# Patient Record
Sex: MALE | Race: WHITE
[De-identification: names, ages, dates, MRNs, and addresses within clinical notes are randomized per-mention and may not be internally consistent; named-entity substitution may affect disease eponyms.]

---

## 2018-02-19 ENCOUNTER — HOSPITAL ENCOUNTER (OUTPATIENT)
Dept: HOSPITAL 47 - RADXRMAIN | Age: 33
Discharge: HOME | End: 2018-02-19
Payer: COMMERCIAL

## 2018-02-19 DIAGNOSIS — M25.552: ICD-10-CM

## 2018-02-19 DIAGNOSIS — M51.37: Primary | ICD-10-CM

## 2018-02-19 DIAGNOSIS — M25.551: ICD-10-CM

## 2018-02-19 PROCEDURE — 72110 X-RAY EXAM L-2 SPINE 4/>VWS: CPT

## 2018-02-19 PROCEDURE — 73521 X-RAY EXAM HIPS BI 2 VIEWS: CPT

## 2018-02-19 NOTE — XR
EXAM TYPE: LUMBAR SPINE X RAY SERIES

 

COMPARISON: NONE

 

HISTORY: Back pain

 

TECHNIQUE: 4 views are submitted.

 

FINDINGS:

Alignment is anatomic.  The pedicles are intact.  The transverse processes are intact.  There is no s
pondylolysis or spondylolisthesis.  Hypertrophic and degenerative disc disease L4-5 and L5-S1.

 

IMPRESSION:

1. Degenerative disc disease L4-5 and L5-S1 consider MRI follow-up.

## 2018-02-19 NOTE — XR
EXAMINATION TYPE: XR Hip Bilateral Complete

 

DATE OF EXAM: 2/19/2018

 

COMPARISON: NONE

 

HISTORY: Pain

 

TECHNIQUE: 2 views of each hip submitted

 

FINDINGS:

There is no evidence of erosive change or acute fracture.

 

 

IMPRESSION:

1. No evidence of acute fracture or dislocation. If symptoms persist consider MRI.

## 2019-02-02 ENCOUNTER — HOSPITAL ENCOUNTER (EMERGENCY)
Dept: HOSPITAL 47 - EC | Age: 34
LOS: 1 days | Discharge: HOME | End: 2019-02-03
Payer: COMMERCIAL

## 2019-02-02 VITALS
TEMPERATURE: 98.3 F | HEART RATE: 97 BPM | RESPIRATION RATE: 20 BRPM | DIASTOLIC BLOOD PRESSURE: 81 MMHG | SYSTOLIC BLOOD PRESSURE: 125 MMHG

## 2019-02-02 DIAGNOSIS — Z23: ICD-10-CM

## 2019-02-02 DIAGNOSIS — F17.200: ICD-10-CM
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
## 2019-02-02 DIAGNOSIS — Z53.29: ICD-10-CM

## 2019-02-02 DIAGNOSIS — S61.210A: Primary | ICD-10-CM
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
## 2019-02-02 PROCEDURE — 90471 IMMUNIZATION ADMIN: CPT

## 2019-02-02 PROCEDURE — 12001 RPR S/N/AX/GEN/TRNK 2.5CM/<: CPT

## 2019-02-02 PROCEDURE — 90715 TDAP VACCINE 7 YRS/> IM: CPT

## 2019-02-02 PROCEDURE — 99283 EMERGENCY DEPT VISIT LOW MDM: CPT

## 2019-02-02 NOTE — XR
EXAMINATION TYPE: XR finger RT

 

DATE OF EXAM: 2/2/2019

 

COMPARISON: NONE

 

HISTORY: Laceration

 

TECHNIQUE: 3 views

 

FINDINGS: There is laceration deformity of the soft tissues at the PIP joint of the index finger. I s
ee no radiopaque foreign body. I see no fracture. There is no dislocation.

 

IMPRESSION: Large laceration deformity.

## 2019-02-03 NOTE — ED
General Adult HPI





- General


Chief complaint: Wound/Laceration


Stated complaint: Lac Finger


Time Seen by Provider: 02/02/19 22:58


Source: patient, RN notes reviewed


Mode of arrival: ambulatory


Limitations: no limitations





- History of Present Illness


Initial comments: 





33-year-old male presents to the emergency department for a chief complaint of 

laceration that occurred approximately one hour prior to arrival.  Patient was 

using a  when he cut his finger.  Patient denies any pain with 

movement of the finger.  Patient states he stopped bleeding shortly afterwards 

and bleeding is controlled at this time.  He is not up-to-date on tetanus.  No 

other complaints at this time.  Patient has no other complaints at this time 

including shortness of breath, chest pain, abdominal pain, nausea or vomiting, 

headache, or visual changes.





- Related Data


 Previous Rx's











 Medication  Instructions  Recorded


 


Cephalexin [Keflex] 500 mg PO Q6HR 3 Days #12 cap 02/03/19











 Allergies











Allergy/AdvReac Type Severity Reaction Status Date / Time


 


No Known Allergies Allergy   Verified 02/02/19 22:56














Review of Systems


ROS Statement: 


Those systems with pertinent positive or pertinent negative responses have been 

documented in the HPI.





ROS Other: All systems not noted in ROS Statement are negative.





Past Medical History


Past Medical History: No Reported History


History of Any Multi-Drug Resistant Organisms: None Reported


Past Surgical History: No Surgical Hx Reported


Past Psychological History: No Psychological Hx Reported


Smoking Status: Current every day smoker


Past Alcohol Use History: Rare


Past Drug Use History: None Reported





General Exam


Limitations: no limitations


General appearance: alert, in no apparent distress


Head exam: Present: atraumatic, normocephalic, normal inspection


Eye exam: Present: normal appearance, PERRL, EOMI.  Absent: scleral icterus, 

conjunctival injection, periorbital swelling


ENT exam: Present: normal exam, mucous membranes moist


Neck exam: Present: normal inspection, full ROM.  Absent: tenderness, 

meningismus, lymphadenopathy


Respiratory exam: Present: normal lung sounds bilaterally.  Absent: respiratory 

distress, wheezes, rales, rhonchi, stridor


Cardiovascular Exam: Present: regular rate, normal rhythm, normal heart sounds.

  Absent: systolic murmur, diastolic murmur, rubs, gallop, clicks


Extremities exam: Present: full ROM (Full range of motion of the right second 

digit including the MCP, PIP, and DIP joints), normal capillary refill (

Capillary refill less than 2 seconds in the right second digit as well as all 

digits of the right hand), other (Patient does have a 2 cm laceration to the 

palmar aspect of the right second finger PIP joint.  I do not see any foreign 

bodies.  I do not see evidence for tendon injury.  Deep structures appear 

unaffected)


Neurological exam: Present: alert, oriented X3, CN II-XII intact


Psychiatric exam: Present: normal affect, normal mood





Course


 Vital Signs











  02/02/19





  22:53


 


Temperature 98.3 F


 


Pulse Rate 97


 


Respiratory 20





Rate 


 


Blood Pressure 125/81


 


O2 Sat by Pulse 99





Oximetry 














Medical Decision Making





- Medical Decision Making





33-year-old male presents for laceration to the right second digit.  Full range 

of motion.  No evidence for tendon injury.  No evidence of deep structure 

injury.  X-ray shows large laceration deformity.  This was cleaned thoroughly 

with soap and water as well as saline pressure irrigation.  Wound was then 

approximated with 4 simple interrupted sutures.  I did offer to add another 

suturable patient refuses.  Patient was given tetanus shot.  Patient was also 

given 3 days of antibiotics to prevent any infection as this is due to his 

finger.  Patient will follow-up with primary care in 1-2 days for wound 

recheck.  He was educated to return here in 7-10 days to have sutures removed.  

He agrees to this.  He will also monitor for signs of infection.





Disposition


Clinical Impression: 


 Laceration





Disposition: HOME SELF-CARE


Condition: Good


Instructions (If sedation given, give patient instructions):  Care For Your 

Stitches (ED), Laceration (ED)


Additional Instructions: 


Please take antibiotic as directed.  Please monitor for signs of infection such 

as spreading or streaking redness.  Return to the emergency department for any 

worsening symptoms.  Otherwise follow-up with primary care in 1-2 days for 

wound recheck.


Prescriptions: 


Cephalexin [Keflex] 500 mg PO Q6HR 3 Days #12 cap


Is patient prescribed a controlled substance at d/c from ED?: No


Referrals: 


Ramírez Navarrete DO [Primary Care Provider] - 1-2 days


Time of Disposition: 00:43